# Patient Record
Sex: MALE | Race: BLACK OR AFRICAN AMERICAN | NOT HISPANIC OR LATINO | Employment: STUDENT | URBAN - METROPOLITAN AREA
[De-identification: names, ages, dates, MRNs, and addresses within clinical notes are randomized per-mention and may not be internally consistent; named-entity substitution may affect disease eponyms.]

---
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
## 2024-04-24 ENCOUNTER — TELEPHONE (OUTPATIENT)
Dept: PEDIATRICS | Facility: CLINIC | Age: 19
End: 2024-04-24
Payer: COMMERCIAL

## 2024-04-24 NOTE — TELEPHONE ENCOUNTER
Copied from CRM #[id number]. Topic: Transfer to Department for Scheduling  >> Apr 24, 2024  4:05 PM Heidy FREGOSO wrote:  Good afternoon. The mom of this pt called requesting a sick visit for an STD check for bumps all over his body. He is established with Dr. Ashley Garland and is now 18 years old. I scheduled him as an established pt sick visit with Dr. Garland on 5/7/24. I would like to double check to make sure this is the correct way to schedule this appt. Please let me know if there is anything else I may do for this pt. Thank you!

## 2024-04-24 NOTE — TELEPHONE ENCOUNTER
You can schedule with Dr. Garland as either a sick or followup appointment, whichever she has.  Thanks

## 2024-05-06 ENCOUNTER — TELEPHONE (OUTPATIENT)
Dept: PEDIATRICS | Facility: CLINIC | Age: 19
End: 2024-05-06

## 2024-05-07 ENCOUNTER — OFFICE VISIT (OUTPATIENT)
Dept: PEDIATRICS | Facility: CLINIC | Age: 19
End: 2024-05-07
Payer: COMMERCIAL

## 2024-05-07 VITALS
DIASTOLIC BLOOD PRESSURE: 71 MMHG | HEART RATE: 74 BPM | SYSTOLIC BLOOD PRESSURE: 132 MMHG | WEIGHT: 183.2 LBS | TEMPERATURE: 98.6 F | RESPIRATION RATE: 20 BRPM

## 2024-05-07 DIAGNOSIS — B08.1 MOLLUSCUM CONTAGIOSUM: Primary | ICD-10-CM

## 2024-05-07 PROCEDURE — 99213 OFFICE O/P EST LOW 20 MIN: CPT | Mod: GC | Performed by: PEDIATRICS

## 2024-05-07 PROCEDURE — 99213 OFFICE O/P EST LOW 20 MIN: CPT | Performed by: PEDIATRICS

## 2024-05-07 ASSESSMENT — PAIN SCALES - GENERAL: PAINLEVEL: 0-NO PAIN

## 2024-05-07 NOTE — PROGRESS NOTES
Subjective   Patient ID: Johnathan Bello is a 18 y.o. male who presents for concern for STI.    He noticed bumps 3 weeks ago - first thing he noticed was bumps on his belly.  A week later he noticed bumps spread to the rest of his body including his penis and his groin area but it wasn't itchy or painful.  He noticed a scar on his face about 2 weeks ago, so he went to the ER and they tested his urine and blood for herpes, chlamydia and gonorrhea and it came back negative.   Now, the bumps have scabbed over, never drained any pus (only bleeds if he scratches it), the bumps in the groin area are resolved. No penile discharge or burning with urination.   He is sexually active - last time was a week ago, 1 partner in [place], uses condoms every time, 10-15 lifetime partners.    Per chart review, he went to  in [place] on 4/16 where he was tested for herpesvirus, chlamydia, gonorrhea, and trichomonas. He was given pre-emptive IM ceftriaxone, doxycycline and valacyclovir. After his test results came back negative, he stopped taking the pills.   He then went back on 4/24 due to the spread of the bumps and was diagnosed with molluscum contagiosum and given 5 days of prednisone, which he took 3 days of.          Objective   Physical Exam  Cardiovascular:      Rate and Rhythm: Normal rate and regular rhythm.   Pulmonary:      Effort: Pulmonary effort is normal.      Breath sounds: Normal breath sounds.   Abdominal:      General: Abdomen is flat.   Genitourinary:     Comments: Hypopigmented papules on shaft of penis.  No ulcer or vesicles.  Skin:     Comments: Hypopigmented papules on lower abdomen.  Excoriations on bilateral biceps.         Assessment/Plan        18 year old M presenting with lesions on his abdomen and penis consistent with resolving molluscum contagiosum. Counseled on the course of the disease and provided dermatology referral if the lesions spread. The lesions on his arms are likely allergic reaction,  not typical appearance for molluscum. No STI testing indicated today given recent testing and no symptoms at this time. He will schedule well care visit and would like to get HIV and syphilis tests done at that time.    Seen and discussed with Dr. Garland.    America Wen MD 05/07/24 4:27 PM

## 2024-05-07 NOTE — PATIENT INSTRUCTIONS
Thank you for coming in today Johnathan!     The bumps that you are talking about were likely molluscum contagiosum. I have attached some information about it.  We do not need to do any additional tests today, but we referred you to dermatology.    Please come back and see us for your well care visit when you are back in town!

## 2024-05-15 ENCOUNTER — OFFICE VISIT (OUTPATIENT)
Dept: DERMATOLOGY | Facility: CLINIC | Age: 19
End: 2024-05-15
Payer: COMMERCIAL

## 2024-05-15 DIAGNOSIS — B08.1 MOLLUSCUM CONTAGIOSUM: Primary | ICD-10-CM

## 2024-05-15 DIAGNOSIS — L70.0 ACNE VULGARIS: ICD-10-CM

## 2024-05-15 PROCEDURE — 17110 DESTRUCTION B9 LES UP TO 14: CPT | Performed by: STUDENT IN AN ORGANIZED HEALTH CARE EDUCATION/TRAINING PROGRAM

## 2024-05-15 PROCEDURE — 99203 OFFICE O/P NEW LOW 30 MIN: CPT | Performed by: STUDENT IN AN ORGANIZED HEALTH CARE EDUCATION/TRAINING PROGRAM

## 2024-05-15 NOTE — PROGRESS NOTES
Subjective     Johnathan Bello is a 18 y.o. male who presents for the following: Molluscum Contagiosum (Patient has experienced flare ups under arms, abdomen, groin area. Flaring has resolved except for one spot on stomach. He would like medication to treat future flare ups.).     Review of Systems:  No other skin or systemic complaints other than what is documented elsewhere in the note.    The following portions of the chart were reviewed this encounter and updated as appropriate:          Skin Cancer History  No skin cancer on file.      Specialty Problems    None       Objective   Well appearing patient in no apparent distress; mood and affect are within normal limits.    A focused skin examination was performed. All findings within normal limits unless otherwise noted below.    Assessment/Plan   1. Molluscum contagiosum  Right Abdomen (side) - Lower  1 umbilicated pink papule    Reviewed viral etiology and transmissible nature of the condition. Reviewed possible sexually transmitted nature. Reviewed treatment options including LN2 cryotherapy, observation, topical retinoids.    Patient opted for treatment with LN2 today. Reviewed high risk of dyspigmentation following treatment    For new spots, consider adapalene 0.1% gel nightly to affected areas.       Destr of lesion - Right Abdomen (side) - Lower  Complexity: simple    Destruction method: cryotherapy    Informed consent: discussed and consent obtained    Debridement: hyperkeratotic portion removed with sharp debridement    Lesion destroyed using liquid nitrogen: Yes    Cryotherapy cycles:  3  Outcome: patient tolerated procedure well with no complications    Post-procedure details: wound care instructions given      2. Acne vulgaris  Head - Anterior (Face)  Follicularly based papules and pustules with comedones      Start adapalene 0.1% gel every other night to face, pea sized amount  Discussed risk of irritation.

## 2024-09-05 ENCOUNTER — APPOINTMENT (OUTPATIENT)
Dept: DERMATOLOGY | Facility: CLINIC | Age: 19
End: 2024-09-05
Payer: COMMERCIAL